# Patient Record
Sex: MALE | Race: WHITE | NOT HISPANIC OR LATINO | Employment: FULL TIME | URBAN - METROPOLITAN AREA
[De-identification: names, ages, dates, MRNs, and addresses within clinical notes are randomized per-mention and may not be internally consistent; named-entity substitution may affect disease eponyms.]

---

## 2017-06-27 ENCOUNTER — HISTORICAL (OUTPATIENT)
Dept: PREADMISSION TESTING | Facility: HOSPITAL | Age: 57
End: 2017-06-27

## 2017-06-27 ENCOUNTER — HISTORICAL (OUTPATIENT)
Dept: LAB | Facility: HOSPITAL | Age: 57
End: 2017-06-27

## 2017-06-27 LAB
ABS NEUT (OLG): 3.43 X10(3)/MCL (ref 2.1–9.2)
BASOPHILS # BLD AUTO: 0 X10(3)/MCL (ref 0–0.2)
BASOPHILS NFR BLD AUTO: 1 %
BUN SERPL-MCNC: 20 MG/DL (ref 7–18)
CALCIUM SERPL-MCNC: 8.4 MG/DL (ref 8.5–10.1)
CHLORIDE SERPL-SCNC: 109 MMOL/L (ref 98–107)
CO2 SERPL-SCNC: 24 MMOL/L (ref 21–32)
CREAT SERPL-MCNC: 0.94 MG/DL (ref 0.7–1.3)
EOSINOPHIL # BLD AUTO: 0.2 X10(3)/MCL (ref 0–0.9)
EOSINOPHIL NFR BLD AUTO: 3 %
ERYTHROCYTE [DISTWIDTH] IN BLOOD BY AUTOMATED COUNT: 14.8 % (ref 11.5–17)
GLUCOSE SERPL-MCNC: 89 MG/DL (ref 74–106)
HCT VFR BLD AUTO: 45.3 % (ref 42–52)
HGB BLD-MCNC: 14.6 GM/DL (ref 14–18)
LYMPHOCYTES # BLD AUTO: 2 X10(3)/MCL (ref 0.6–4.6)
LYMPHOCYTES NFR BLD AUTO: 31 %
MCH RBC QN AUTO: 28.3 PG (ref 27–31)
MCHC RBC AUTO-ENTMCNC: 32.2 GM/DL (ref 33–36)
MCV RBC AUTO: 88 FL (ref 80–94)
MONOCYTES # BLD AUTO: 0.7 X10(3)/MCL (ref 0.1–1.3)
MONOCYTES NFR BLD AUTO: 11 %
NEUTROPHILS # BLD AUTO: 3.43 X10(3)/MCL (ref 2.1–9.2)
NEUTROPHILS NFR BLD AUTO: 54 %
PLATELET # BLD AUTO: 199 X10(3)/MCL (ref 130–400)
PMV BLD AUTO: 11.8 FL (ref 9.4–12.4)
POTASSIUM SERPL-SCNC: 4.6 MMOL/L (ref 3.5–5.1)
RBC # BLD AUTO: 5.15 X10(6)/MCL (ref 4.7–6.1)
SODIUM SERPL-SCNC: 142 MMOL/L (ref 136–145)
WBC # SPEC AUTO: 6.4 X10(3)/MCL (ref 4.5–11.5)

## 2017-07-06 ENCOUNTER — HISTORICAL (OUTPATIENT)
Dept: SURGERY | Facility: HOSPITAL | Age: 57
End: 2017-07-06

## 2019-07-06 LAB
BILIRUB SERPL-MCNC: NEGATIVE MG/DL
BLOOD URINE, POC: NEGATIVE
CLARITY, POC UA: CLEAR
COLOR, POC UA: NORMAL
GLUCOSE UR QL STRIP: NEGATIVE
KETONES UR QL STRIP: NEGATIVE
LEUKOCYTE EST, POC UA: NEGATIVE
NITRITE, POC UA: NEGATIVE
PH, POC UA: 5
PROTEIN, POC: NEGATIVE
SPECIFIC GRAVITY, POC UA: 1.02
UROBILINOGEN, POC UA: NORMAL

## 2021-12-29 LAB
INFLUENZA A ANTIGEN, POC: NEGATIVE
INFLUENZA B ANTIGEN, POC: NEGATIVE
SARS-COV-2 RNA RESP QL NAA+PROBE: POSITIVE

## 2022-01-02 ENCOUNTER — HISTORICAL (OUTPATIENT)
Dept: ADMINISTRATIVE | Facility: HOSPITAL | Age: 62
End: 2022-01-02

## 2022-01-02 LAB — SARS-COV-2 RNA RESP QL NAA+PROBE: POSITIVE

## 2022-04-10 ENCOUNTER — HISTORICAL (OUTPATIENT)
Dept: ADMINISTRATIVE | Facility: HOSPITAL | Age: 62
End: 2022-04-10
Payer: COMMERCIAL

## 2022-04-28 VITALS
SYSTOLIC BLOOD PRESSURE: 155 MMHG | OXYGEN SATURATION: 97 % | WEIGHT: 315 LBS | DIASTOLIC BLOOD PRESSURE: 87 MMHG | BODY MASS INDEX: 42.66 KG/M2 | HEIGHT: 72 IN

## 2022-04-30 NOTE — OP NOTE
DATE OF SURGERY:    07/06/2017    SURGEON:  Evaristo Pathak MD    PREOPERATIVE DIAGNOSIS:  Recurrent incarcerated umbilical hernia.    POSTOPERATIVE DIAGNOSIS:  Recurrent incarcerated umbilical hernia.    PROCEDURE:  Repair of an incarcerated recurrent umbilical hernia.    PROCEDURE IN DETAIL:  The patient was placed on the operating table and in supine position.  Abdomen was prepped and draped in the usual sterile fashion after placing him under general endotracheal anesthesia.  After appropriate timeout, the incision was performed in vertical fashion starting three fingerbreadths above the umbilicus encircling the umbilicus to the right and continuing another three fingerbreadths just below the umbilicus.  The patient had a fairly large protrusion, but as we dissected the hernia sac and tissue away from the fascial defect, the defect did not even admit my index and long fingers in diameter.  The hernia sac was opened in omentum, which was trapped and stuck, was freed up from the hernia sac and reduced.  The excess hernia sac was excised and submitted for pathologic exam.  Elevation of skin flaps was then done and closure of the fascia and peritoneum simultaneously was accomplished with interrupted figure-of-eight sutures of #1 Prolene.  I took the polypropylene mesh after having soaked it in antibiotics soaked bacitracin, placed it on the fascial layer and secured it with 0 Prolene and VersaTack stapling device.  15 Wes drain was placed over the repair and brought out through a separate stab wound down the right lower quadrant and secured to the skin with 2-0 silk suture ligature.  The umbilicus was tacked back down with 3-0 Vicryl suture and subcutaneous tissue approximated with 2-0 plain catgut suture.  Skin was closed with proximate autostapling device completing the procedure.  Estimated blood loss was less than 15 mL.  Sponge, needle and instrument counts are correct.  The patient had dressing  applied.  The patient was awakened, placed in abdominal binder and brought to the recovery room in stable condition.        ______________________________  MD NERY Fernandez/SVETA  DD:  07/06/2017  Time:  01:38PM  DT:  07/07/2017  Time:  07:11AM  Job #:  [id number]

## 2022-08-01 ENCOUNTER — OFFICE VISIT (OUTPATIENT)
Dept: UROLOGY | Facility: CLINIC | Age: 62
End: 2022-08-01
Payer: COMMERCIAL

## 2022-08-01 VITALS
WEIGHT: 315 LBS | HEIGHT: 72 IN | SYSTOLIC BLOOD PRESSURE: 131 MMHG | BODY MASS INDEX: 42.66 KG/M2 | HEART RATE: 77 BPM | TEMPERATURE: 98 F | RESPIRATION RATE: 18 BRPM | DIASTOLIC BLOOD PRESSURE: 79 MMHG | OXYGEN SATURATION: 98 %

## 2022-08-01 DIAGNOSIS — R35.0 BENIGN PROSTATIC HYPERPLASIA WITH URINARY FREQUENCY: Primary | ICD-10-CM

## 2022-08-01 DIAGNOSIS — N40.1 BENIGN PROSTATIC HYPERPLASIA WITH URINARY FREQUENCY: Primary | ICD-10-CM

## 2022-08-01 DIAGNOSIS — R35.0 URINARY FREQUENCY: ICD-10-CM

## 2022-08-01 PROCEDURE — 3008F BODY MASS INDEX DOCD: CPT | Mod: CPTII,,, | Performed by: NURSE PRACTITIONER

## 2022-08-01 PROCEDURE — 3075F SYST BP GE 130 - 139MM HG: CPT | Mod: CPTII,,, | Performed by: NURSE PRACTITIONER

## 2022-08-01 PROCEDURE — 99214 PR OFFICE/OUTPT VISIT, EST, LEVL IV, 30-39 MIN: ICD-10-PCS | Mod: S$PBB,,, | Performed by: NURSE PRACTITIONER

## 2022-08-01 PROCEDURE — 3008F PR BODY MASS INDEX (BMI) DOCUMENTED: ICD-10-PCS | Mod: CPTII,,, | Performed by: NURSE PRACTITIONER

## 2022-08-01 PROCEDURE — 3075F PR MOST RECENT SYSTOLIC BLOOD PRESS GE 130-139MM HG: ICD-10-PCS | Mod: CPTII,,, | Performed by: NURSE PRACTITIONER

## 2022-08-01 PROCEDURE — 3078F DIAST BP <80 MM HG: CPT | Mod: CPTII,,, | Performed by: NURSE PRACTITIONER

## 2022-08-01 PROCEDURE — 3078F PR MOST RECENT DIASTOLIC BLOOD PRESSURE < 80 MM HG: ICD-10-PCS | Mod: CPTII,,, | Performed by: NURSE PRACTITIONER

## 2022-08-01 PROCEDURE — 99214 OFFICE O/P EST MOD 30 MIN: CPT | Mod: PBBFAC

## 2022-08-01 PROCEDURE — 99214 OFFICE O/P EST MOD 30 MIN: CPT | Mod: S$PBB,,, | Performed by: NURSE PRACTITIONER

## 2022-08-01 PROCEDURE — 51798 US URINE CAPACITY MEASURE: CPT | Mod: PBBFAC | Performed by: NURSE PRACTITIONER

## 2022-08-01 RX ORDER — ASPIRIN 81 MG/1
81 TABLET ORAL DAILY
COMMUNITY

## 2022-08-01 RX ORDER — TAMSULOSIN HYDROCHLORIDE 0.4 MG/1
0.4 CAPSULE ORAL DAILY
Qty: 30 CAPSULE | Refills: 11 | Status: SHIPPED | OUTPATIENT
Start: 2022-08-01 | End: 2022-11-28

## 2022-08-01 NOTE — PROGRESS NOTES
Patient seen by DALE Patel. Rx for flomax and RTC 6 weeks. Discharge instructions given verbal and written.

## 2022-08-01 NOTE — PROGRESS NOTES
Chief Complaint:   Chief Complaint   Patient presents with    Urinary Frequency     Referral 12/07/2021 for urinary frequency        HPI:  Patient is a 61-year-old male referred to Urology for urinary frequency.  Patient by PCP was noted to have episodes of Urinary frequency 10 per day.  Patient admits to drinking 10 Dr. Prepper day.  But recently has tried to cut back as far down to 6-7.  Today patient presents with urinary urgency, frequency, intermittent erectile dysfunction.  Patient denies dysuria, incontinence, retention, gross hematuria, nocturia. Patient denies family history of urologic pathology, personal history of stones.       Allergies:  Review of patient's allergies indicates:  No Known Allergies    Medications:  Current Outpatient Medications   Medication Sig Dispense Refill    aspirin (ECOTRIN) 81 MG EC tablet Take 81 mg by mouth Daily.       No current facility-administered medications for this visit.       Review of Systems:  General: No fever, chills, fatigability, or weight loss.  Skin: No rashes, itching, or changes in color or texture of skin.  Chest: Denies CALDWELL, cyanosis, wheezing, cough, and sputum production.  Abdomen: Appetite fine. No weight loss. Denies diarrhea, abdominal pain, hematemesis, or blood in stool.  Musculoskeletal: No joint stiffness or swelling. Denies back pain.  : As above.  All other review of systems negative.    PMH:  No past medical history on file.    PSH:  Past Surgical History:   Procedure Laterality Date    CHOLECYSTECTOMY      HERNIA REPAIR      LIVER BIOPSY         FamHx:  Family History   Problem Relation Age of Onset    Heart disease Father     Cancer Mother     COPD Mother        SocHx:  Social History     Socioeconomic History    Marital status:    Tobacco Use    Smoking status: Former Smoker    Smokeless tobacco: Never Used       Physical Exam:  Vitals:    08/01/22 0746   BP: 131/79   Pulse: 77   Resp: 18   Temp: 98.4 °F (36.9 °C)      General: A&Ox3, no apparent distress, no deformities  Neck: No masses, normal thyroid  Lungs: CTA rosalino, no use of accessory muscles  Heart: RRR, no arrhythmias  Abdomen: Soft, NT, ND, no masses, no hernias, no hepatosplenomegaly  Lymphatic: Neck and groin nodes negative  Skin: The skin is warm and dry. No jaundice.  Ext: No c/c/e.    GUMale: Test desc rosalino, no abnormalities of epididymus. Penis, with normal penile and scrotal skin. Meatus normal. Normal rectal tone, no hemorrhoids. Prostate 2 fingerbreadths wide, smooth, soft, nontender, no nodules or masses appreciated. SV not palpable. Perineum and anus normal.          Impression:  Erectile dysfunction, BPH, urinary Frequency      Plan: Start Flomax 0.4 mg p.o. q.h.s. F/U 6 Weeks.  Instructed patient to decrease consult from Dr. Blair, instructed patient on timed voiding every 2-3 hours, double voiding, avoidance of bladder irritants such as alcohol citrus foods & drinks, chocolates, caffeinated drinks, energy drinks, spicey foods, sodas, increase water intake.

## 2022-09-17 ENCOUNTER — HISTORICAL (OUTPATIENT)
Dept: ADMINISTRATIVE | Facility: HOSPITAL | Age: 62
End: 2022-09-17
Payer: COMMERCIAL

## 2022-11-28 ENCOUNTER — OFFICE VISIT (OUTPATIENT)
Dept: UROLOGY | Facility: CLINIC | Age: 62
End: 2022-11-28
Payer: COMMERCIAL

## 2022-11-28 VITALS
WEIGHT: 315 LBS | HEART RATE: 76 BPM | SYSTOLIC BLOOD PRESSURE: 138 MMHG | HEIGHT: 71 IN | RESPIRATION RATE: 18 BRPM | TEMPERATURE: 98 F | BODY MASS INDEX: 44.1 KG/M2 | OXYGEN SATURATION: 100 % | DIASTOLIC BLOOD PRESSURE: 86 MMHG

## 2022-11-28 DIAGNOSIS — R33.8 BENIGN PROSTATIC HYPERPLASIA WITH URINARY RETENTION: Primary | ICD-10-CM

## 2022-11-28 DIAGNOSIS — N40.1 BENIGN PROSTATIC HYPERPLASIA WITH URINARY RETENTION: Primary | ICD-10-CM

## 2022-11-28 PROCEDURE — 3079F PR MOST RECENT DIASTOLIC BLOOD PRESSURE 80-89 MM HG: ICD-10-PCS | Mod: CPTII,,, | Performed by: NURSE PRACTITIONER

## 2022-11-28 PROCEDURE — 1159F PR MEDICATION LIST DOCUMENTED IN MEDICAL RECORD: ICD-10-PCS | Mod: CPTII,,, | Performed by: NURSE PRACTITIONER

## 2022-11-28 PROCEDURE — 99213 OFFICE O/P EST LOW 20 MIN: CPT | Mod: S$PBB,,, | Performed by: NURSE PRACTITIONER

## 2022-11-28 PROCEDURE — 1160F PR REVIEW ALL MEDS BY PRESCRIBER/CLIN PHARMACIST DOCUMENTED: ICD-10-PCS | Mod: CPTII,,, | Performed by: NURSE PRACTITIONER

## 2022-11-28 PROCEDURE — 3008F BODY MASS INDEX DOCD: CPT | Mod: CPTII,,, | Performed by: NURSE PRACTITIONER

## 2022-11-28 PROCEDURE — 3075F SYST BP GE 130 - 139MM HG: CPT | Mod: CPTII,,, | Performed by: NURSE PRACTITIONER

## 2022-11-28 PROCEDURE — 99214 OFFICE O/P EST MOD 30 MIN: CPT | Mod: PBBFAC | Performed by: NURSE PRACTITIONER

## 2022-11-28 PROCEDURE — 99213 PR OFFICE/OUTPT VISIT, EST, LEVL III, 20-29 MIN: ICD-10-PCS | Mod: S$PBB,,, | Performed by: NURSE PRACTITIONER

## 2022-11-28 PROCEDURE — 3079F DIAST BP 80-89 MM HG: CPT | Mod: CPTII,,, | Performed by: NURSE PRACTITIONER

## 2022-11-28 PROCEDURE — 1160F RVW MEDS BY RX/DR IN RCRD: CPT | Mod: CPTII,,, | Performed by: NURSE PRACTITIONER

## 2022-11-28 PROCEDURE — 1159F MED LIST DOCD IN RCRD: CPT | Mod: CPTII,,, | Performed by: NURSE PRACTITIONER

## 2022-11-28 PROCEDURE — 3075F PR MOST RECENT SYSTOLIC BLOOD PRESS GE 130-139MM HG: ICD-10-PCS | Mod: CPTII,,, | Performed by: NURSE PRACTITIONER

## 2022-11-28 PROCEDURE — 3008F PR BODY MASS INDEX (BMI) DOCUMENTED: ICD-10-PCS | Mod: CPTII,,, | Performed by: NURSE PRACTITIONER

## 2022-11-28 RX ORDER — TAMSULOSIN HYDROCHLORIDE 0.4 MG/1
0.4 CAPSULE ORAL DAILY
Qty: 90 CAPSULE | Refills: 3 | Status: SHIPPED | OUTPATIENT
Start: 2022-11-28 | End: 2023-11-28

## 2022-11-28 NOTE — PROGRESS NOTES
Pt seen by DALE Patel; Pt instructed to return to clinic in 6 months w/PSA; Discharge paperwork given w/pt verbalizing understanding

## 2022-11-28 NOTE — PROGRESS NOTES
Chief Complaint:   Chief Complaint   Patient presents with    6 week follow up ED, BPH & frequency (Pt not taking flomax)       HPI:  Patient is a 62-year-old male here for 6 week follow-up BPH and ED. patient admits to not taking tamsulosin as prescribed (non-compliant with meds).  Patient states he is decreased his Dr. Peppers from anywhere to 2-4 per day but has increased water intake.  Today patient denies dysuria, urinary urgency, frequency, incontinence, retention, gross hematuria, nocturia. Patient denies family history of urologic pathology.         Allergies:  Review of patient's allergies indicates:  No Known Allergies    Medications:  Current Outpatient Medications   Medication Sig Dispense Refill    aspirin (ECOTRIN) 81 MG EC tablet Take 81 mg by mouth Daily.      tamsulosin (FLOMAX) 0.4 mg Cap Take 1 capsule (0.4 mg total) by mouth once daily. (Patient not taking: Reported on 11/28/2022) 30 capsule 11     No current facility-administered medications for this visit.       Review of Systems:  General: No fever, chills, fatigability, or weight loss.  Skin: No rashes, itching, or changes in color or texture of skin.  Chest: Denies CALDWELL, cyanosis, wheezing, cough, and sputum production.  Abdomen: Appetite fine. No weight loss. Denies diarrhea, abdominal pain, hematemesis, or blood in stool.  Musculoskeletal: No joint stiffness or swelling. Denies back pain.  : As above.  All other review of systems negative.    PMH:  History reviewed. No pertinent past medical history.    PSH:  Past Surgical History:   Procedure Laterality Date    CHOLECYSTECTOMY      HERNIA REPAIR      LIVER BIOPSY         FamHx:  Family History   Problem Relation Age of Onset    Heart disease Father     Cancer Mother     COPD Mother        SocHx:  Social History     Socioeconomic History    Marital status:    Tobacco Use    Smoking status: Former    Smokeless tobacco: Never   Substance and Sexual Activity    Alcohol use: Not  Currently    Drug use: Never       Physical Exam:  Vitals:    11/28/22 0840   BP: 138/86   Pulse: 76   Resp: 18   Temp: 97.6 °F (36.4 °C)     General: A&Ox3, no apparent distress, no deformities  Neck: No masses, normal thyroid  Lungs: CTA rosalino, no use of accessory muscles  Heart: RRR, no arrhythmias  Abdomen: Soft, NT, ND, no masses, no hernias, no hepatosplenomegaly  Lymphatic: Neck and groin nodes negative  Skin: The skin is warm and dry. No jaundice.  Ext: No c/c/e.        Impression: BPH       Plan: Instructed patient med compliance with tamsulosin.  Reinforced timed voiding every 2-3 hours, double voiding, avoidance of bladder irritants such as alcohol citrus foods & drinks, chocolates, caffeinated drinks, energy drinks, spicy.  Instructed patient on avoiding drinking fluids 2 hours prior to bedtime and throughout the night along with voiding immediately prior to bedtime. RTC 6 months with PSA.

## 2023-01-22 ENCOUNTER — OFFICE VISIT (OUTPATIENT)
Dept: URGENT CARE | Facility: CLINIC | Age: 63
End: 2023-01-22
Payer: COMMERCIAL

## 2023-01-22 VITALS
HEIGHT: 70 IN | BODY MASS INDEX: 45.1 KG/M2 | TEMPERATURE: 98 F | OXYGEN SATURATION: 96 % | HEART RATE: 77 BPM | WEIGHT: 315 LBS | SYSTOLIC BLOOD PRESSURE: 130 MMHG | DIASTOLIC BLOOD PRESSURE: 66 MMHG | RESPIRATION RATE: 20 BRPM

## 2023-01-22 DIAGNOSIS — U07.1 COVID-19 VIRUS DETECTED: ICD-10-CM

## 2023-01-22 DIAGNOSIS — Z11.52 ENCOUNTER FOR SCREENING FOR COVID-19: ICD-10-CM

## 2023-01-22 DIAGNOSIS — U07.1 COVID: Primary | ICD-10-CM

## 2023-01-22 LAB
CTP QC/QA: YES
SARS-COV-2 RDRP RESP QL NAA+PROBE: POSITIVE

## 2023-01-22 PROCEDURE — 99214 OFFICE O/P EST MOD 30 MIN: CPT | Mod: PBBFAC | Performed by: NURSE PRACTITIONER

## 2023-01-22 PROCEDURE — 87635 SARS-COV-2 COVID-19 AMP PRB: CPT | Mod: PBBFAC | Performed by: NURSE PRACTITIONER

## 2023-01-22 PROCEDURE — 99213 OFFICE O/P EST LOW 20 MIN: CPT | Mod: S$PBB,,, | Performed by: NURSE PRACTITIONER

## 2023-01-22 PROCEDURE — 99213 PR OFFICE/OUTPT VISIT, EST, LEVL III, 20-29 MIN: ICD-10-PCS | Mod: S$PBB,,, | Performed by: NURSE PRACTITIONER

## 2023-01-22 NOTE — LETTER
January 22, 2023      [facility]  [facility] [facility] [address]  Phone: [phone]       Patient: Errol Hannah   YOB: 1960  Date of Visit: 01/22/2023    To Whom It May Concern:    Jacob Hannah  was at [facility] on 01/22/2023. The patient may return to work/school on 1/26/2023 with no restrictions. If you have any questions or concerns, or if I can be of further assistance, please do not hesitate to contact me.    Sincerely,    RAMIN Ospina

## 2023-01-22 NOTE — PROGRESS NOTES
"Subjective:       Patient ID: Errol Hannah is a 62 y.o. male.    Vitals:  height is 5' 10.47" (1.79 m) and weight is 143.7 kg (316 lb 12.8 oz) (abnormal). His oral temperature is 98.3 °F (36.8 °C). His blood pressure is 130/66 and his pulse is 77. His respiration is 20 and oxygen saturation is 96%.     Chief Complaint: Cough, Fever (100.3), sneezing, Generalized Body Aches, and covid positive at home test    CC as above.       Constitution: Positive for fever.   HENT: Negative.     Neck: neck negative.   Cardiovascular: Negative.    Respiratory:  Positive for cough.      Objective:      Physical Exam   Constitutional: He is oriented to person, place, and time. He appears well-developed.   HENT:   Head: Normocephalic.   Ears:   Right Ear: Tympanic membrane normal.   Left Ear: Tympanic membrane normal.   Nose: Congestion present.   Mouth/Throat: Oropharynx is clear.   Eyes: Conjunctivae and EOM are normal. Pupils are equal, round, and reactive to light.   Neck: Neck supple.   Cardiovascular: Normal rate, regular rhythm and normal heart sounds.   Pulmonary/Chest: Effort normal and breath sounds normal.   Musculoskeletal: Normal range of motion.         General: Normal range of motion.   Neurological: He is alert and oriented to person, place, and time.   Skin: Skin is warm and dry.   Psychiatric: His behavior is normal.   Vitals reviewed.      Assessment:       1. COVID    2. Encounter for screening for COVID-19              Office Visit on 01/22/2023   Component Date Value Ref Range Status    POC Rapid COVID 01/22/2023 Positive (A)  Negative Final     Acceptable 01/22/2023 Yes   Final        No results found.   Plan:           rapid COVID positive.  Maintain home quarantine for 5 days, may discontinue quarantine when symptoms improve and if fever free for 24 hours without fever reducing medication.  If at any time you experience any increased shortness of breath, wheezing or any increased symptoms then " immediately go to ER.    COVID    Encounter for screening for COVID-19  -     POCT COVID-19 Rapid Screening

## 2023-01-23 ENCOUNTER — NURSE TRIAGE (OUTPATIENT)
Dept: ADMINISTRATIVE | Facility: CLINIC | Age: 63
End: 2023-01-23
Payer: COMMERCIAL

## 2023-01-23 NOTE — TELEPHONE ENCOUNTER
Pts wife called about advice and denies any worsening at this time. Pt just sneezing and runny nose but doesn't need triaging. They just had questions about medications. Pt denies any SOB or fever at this time and wanted to know about medication OTC that he can take. Pts wife told to call back if any SOB, CP or fever for over 3 days and will reach out as needed. Pt will reach out to PCP that is non [facility] provider            Reason for Disposition   Information only question and nurse able to answer    Protocols used: Information Only Call - No Triage-A-[place]

## 2023-05-29 ENCOUNTER — OFFICE VISIT (OUTPATIENT)
Dept: UROLOGY | Facility: CLINIC | Age: 63
End: 2023-05-29
Payer: COMMERCIAL

## 2023-05-29 VITALS
TEMPERATURE: 98 F | SYSTOLIC BLOOD PRESSURE: 97 MMHG | BODY MASS INDEX: 45.1 KG/M2 | DIASTOLIC BLOOD PRESSURE: 74 MMHG | HEIGHT: 70 IN | HEART RATE: 67 BPM | RESPIRATION RATE: 20 BRPM | WEIGHT: 315 LBS | OXYGEN SATURATION: 97 %

## 2023-05-29 DIAGNOSIS — R33.8 BENIGN PROSTATIC HYPERPLASIA WITH URINARY RETENTION: Primary | ICD-10-CM

## 2023-05-29 DIAGNOSIS — R35.1 NOCTURIA: ICD-10-CM

## 2023-05-29 DIAGNOSIS — N32.81 OAB (OVERACTIVE BLADDER): ICD-10-CM

## 2023-05-29 DIAGNOSIS — N40.1 BENIGN PROSTATIC HYPERPLASIA WITH URINARY RETENTION: Primary | ICD-10-CM

## 2023-05-29 DIAGNOSIS — R97.20 ELEVATED PSA: ICD-10-CM

## 2023-05-29 LAB
BILIRUB SERPL-MCNC: NORMAL MG/DL
BLOOD URINE, POC: NORMAL
COLOR, POC UA: YELLOW
GLUCOSE UR QL STRIP: NORMAL
KETONES UR QL STRIP: NORMAL
LEUKOCYTE ESTERASE URINE, POC: NORMAL
NITRITE, POC UA: NORMAL
PH, POC UA: 5.5
PROTEIN, POC: NORMAL
SPECIFIC GRAVITY, POC UA: >=1.03
UROBILINOGEN, POC UA: 0.2

## 2023-05-29 PROCEDURE — 3078F PR MOST RECENT DIASTOLIC BLOOD PRESSURE < 80 MM HG: ICD-10-PCS | Mod: CPTII,,, | Performed by: NURSE PRACTITIONER

## 2023-05-29 PROCEDURE — 3008F BODY MASS INDEX DOCD: CPT | Mod: CPTII,,, | Performed by: NURSE PRACTITIONER

## 2023-05-29 PROCEDURE — 3078F DIAST BP <80 MM HG: CPT | Mod: CPTII,,, | Performed by: NURSE PRACTITIONER

## 2023-05-29 PROCEDURE — 81001 URINALYSIS AUTO W/SCOPE: CPT | Mod: PBBFAC | Performed by: NURSE PRACTITIONER

## 2023-05-29 PROCEDURE — 1159F PR MEDICATION LIST DOCUMENTED IN MEDICAL RECORD: ICD-10-PCS | Mod: CPTII,,, | Performed by: NURSE PRACTITIONER

## 2023-05-29 PROCEDURE — 99214 OFFICE O/P EST MOD 30 MIN: CPT | Mod: PBBFAC | Performed by: NURSE PRACTITIONER

## 2023-05-29 PROCEDURE — 99213 PR OFFICE/OUTPT VISIT, EST, LEVL III, 20-29 MIN: ICD-10-PCS | Mod: S$PBB,,, | Performed by: NURSE PRACTITIONER

## 2023-05-29 PROCEDURE — 1159F MED LIST DOCD IN RCRD: CPT | Mod: CPTII,,, | Performed by: NURSE PRACTITIONER

## 2023-05-29 PROCEDURE — 3074F SYST BP LT 130 MM HG: CPT | Mod: CPTII,,, | Performed by: NURSE PRACTITIONER

## 2023-05-29 PROCEDURE — 1160F PR REVIEW ALL MEDS BY PRESCRIBER/CLIN PHARMACIST DOCUMENTED: ICD-10-PCS | Mod: CPTII,,, | Performed by: NURSE PRACTITIONER

## 2023-05-29 PROCEDURE — 1160F RVW MEDS BY RX/DR IN RCRD: CPT | Mod: CPTII,,, | Performed by: NURSE PRACTITIONER

## 2023-05-29 PROCEDURE — 3008F PR BODY MASS INDEX (BMI) DOCUMENTED: ICD-10-PCS | Mod: CPTII,,, | Performed by: NURSE PRACTITIONER

## 2023-05-29 PROCEDURE — 99213 OFFICE O/P EST LOW 20 MIN: CPT | Mod: S$PBB,,, | Performed by: NURSE PRACTITIONER

## 2023-05-29 PROCEDURE — 3074F PR MOST RECENT SYSTOLIC BLOOD PRESSURE < 130 MM HG: ICD-10-PCS | Mod: CPTII,,, | Performed by: NURSE PRACTITIONER

## 2023-05-29 RX ORDER — OXYBUTYNIN CHLORIDE 5 MG/1
5 TABLET, EXTENDED RELEASE ORAL DAILY
Qty: 90 TABLET | Refills: 3 | Status: SHIPPED | OUTPATIENT
Start: 2023-05-29 | End: 2024-05-28

## 2023-05-29 NOTE — PROGRESS NOTES
Chief Complaint:   Chief Complaint   Patient presents with    BPH- urinary frequency       HPI: Patient is a 62-year-old male here for a six-month follow-up urinary frequency.  Patient by PCP was noted to have episodes of Urinary frequency 10 per day.  Patient admits to drinking 10 Dr. Prepper day.  But recently has tried to cut back as far down to 6-7.  On last visit patient started on Flomax 0.4 mg p.o. daily.  Patient today complaining of nocturia 4-5 times nightly despite abstaining from drinking water 3-4 hours prior to bedtime.  We will trial oxybutynin XL 0.5 mg p.o. daily return to clinic in 6 weeks.    Allergies:  Review of patient's allergies indicates:  No Known Allergies    Medications:  Current Outpatient Medications   Medication Sig Dispense Refill    aspirin (ECOTRIN) 81 MG EC tablet Take 81 mg by mouth Daily.      tamsulosin (FLOMAX) 0.4 mg Cap Take 1 capsule (0.4 mg total) by mouth once daily. 90 capsule 3     No current facility-administered medications for this visit.       Review of Systems:  General: No fever, chills, fatigability, or weight loss.  Skin: No rashes, itching, or changes in color or texture of skin.  Chest: Denies CALDWELL, cyanosis, wheezing, cough, and sputum production.  Abdomen: Appetite fine. No weight loss. Denies diarrhea, abdominal pain, hematemesis, or blood in stool.  Musculoskeletal: No joint stiffness or swelling. Denies back pain.  : As above.  All other review of systems negative.    PMH:  No past medical history on file.    PSH:  Past Surgical History:   Procedure Laterality Date    CHOLECYSTECTOMY      HERNIA REPAIR      LIVER BIOPSY         FamHx:  Family History   Problem Relation Age of Onset    Heart disease Father     Cancer Mother     COPD Mother        SocHx:  Social History     Socioeconomic History    Marital status:    Tobacco Use    Smoking status: Former    Smokeless tobacco: Never   Substance and Sexual Activity    Alcohol use: Not Currently    Drug  use: Never       Physical Exam:  There were no vitals filed for this visit.  General: A&Ox3, no apparent distress, no deformities  Neck: No masses, normal thyroid  Lungs: CTA rosalino, no use of accessory muscles  Heart: RRR, no arrhythmias  Abdomen: Soft, NT, ND, no masses, no hernias, no hepatosplenomegaly  Lymphatic: Neck and groin nodes negative  Skin: The skin is warm and dry. No jaundice.  Ext: No c/c/e.    GUMale: Test desc rosalino, no abnormalities of epididymus. Penis, with normal penile and scrotal skin. Meatus normal. Normal rectal tone, no hemorrhoids. Prostate: finger breadth wide, smooth, soft, nontender, no nodules or masses appreciated. SV not palpable. Perineum and anus normal.      Recent Labs     05/27/23  0844   PSA 2.19        Urinalysis:  Color, UA Yellow    Spec Grav UA >=1.030    pH, UA 5.5    WBC, UA neg    Nitrite, UA neg    Protein, POC neg    Glucose, UA neg    Ketones, UA neg    Urobilinogen, UA 0.2    Bilirubin, POC neg    Blood, UA trace-intact          Impression:  BPH, overactive bladder.      Plan: Instructed patient continue Flomax 0.4 mg p.o. daily and trial oxybutynin XL 5 mg p.o. daily RTC 6 weeks.  Reinforced patient teaching on timed voiding every 2-3 hrs, double voiding, avoidance of bladder irritants such as alcohol, citrus foods, chocolate, caffeinated drinks, energy drinks, spicy foods, sugar, caffeine products, sodas. Instructed patient to avoid drinking fluids 1-2 hours prior to bedtime & void immediately before bedtime.

## 2023-05-29 NOTE — PROGRESS NOTES
Placed in room. Seen by Sony Patel NP. Spoke with patient. Poct and dipstick done. RTC in 6 weeks.

## 2024-05-31 ENCOUNTER — OFFICE VISIT (OUTPATIENT)
Dept: URGENT CARE | Facility: CLINIC | Age: 64
End: 2024-05-31
Payer: COMMERCIAL

## 2024-05-31 VITALS
HEART RATE: 70 BPM | WEIGHT: 315 LBS | HEIGHT: 69 IN | SYSTOLIC BLOOD PRESSURE: 111 MMHG | OXYGEN SATURATION: 97 % | TEMPERATURE: 98 F | BODY MASS INDEX: 46.65 KG/M2 | RESPIRATION RATE: 20 BRPM | DIASTOLIC BLOOD PRESSURE: 70 MMHG

## 2024-05-31 DIAGNOSIS — L03.114 CELLULITIS OF LEFT FOREARM: Primary | ICD-10-CM

## 2024-05-31 PROCEDURE — 99213 OFFICE O/P EST LOW 20 MIN: CPT | Mod: PBBFAC | Performed by: FAMILY MEDICINE

## 2024-05-31 PROCEDURE — 99214 OFFICE O/P EST MOD 30 MIN: CPT | Mod: S$PBB,,, | Performed by: FAMILY MEDICINE

## 2024-05-31 RX ORDER — CLINDAMYCIN HYDROCHLORIDE 300 MG/1
300 CAPSULE ORAL EVERY 8 HOURS
Qty: 30 CAPSULE | Refills: 0 | Status: SHIPPED | OUTPATIENT
Start: 2024-05-31 | End: 2024-06-10

## 2024-05-31 NOTE — PROGRESS NOTES
"Subjective:       Patient ID: Errol Hannah is a 63 y.o. male.    Chief Complaint: Arm Pain (LT redness noted to elbow x 1day, no injury noted)      Arm Pain       63-year-old male with left posterior forearm pain and redness.  It is just below his elbow.  He denies any injuries or falls.  It is painful to touch.  Review of Systems   Integumentary:         As above         Objective:       Vital Signs  Temp: 98 °F (36.7 °C)  Pulse: 70  Resp: 20  SpO2: 97 %  BP: 111/70  Pain Score:   5  Pain Loc: Elbow  Height and Weight  Height: 5' 9" (175.3 cm)  Weight: (!) 143.2 kg (315 lb 11.2 oz)  BSA (Calculated - sq m): 2.64 sq meters  BMI (Calculated): 46.6  Weight in (lb) to have BMI = 25: 168.9]  Physical Exam  Vitals reviewed.   Constitutional:       Appearance: Normal appearance.   HENT:      Head: Normocephalic and atraumatic.   Eyes:      Extraocular Movements: Extraocular movements intact.      Conjunctiva/sclera: Conjunctivae normal.   Skin:            Comments: [place] denotes 2 cm in diameter area of erythema and tenderness to palpation.  No underlying fluctuance or exudate.   Neurological:      General: No focal deficit present.      Mental Status: He is alert.   Psychiatric:         Mood and Affect: Mood normal.         Behavior: Behavior normal.         Assessment:       Problem List Items Addressed This Visit    None  Visit Diagnoses       Cellulitis of left forearm    -  Primary    Relevant Medications    clindamycin (CLEOCIN) 300 MG capsule            Plan:   Encouraged warm compresses and massage  Encouraged ibuprofen/acetaminophen as needed  ER precautions  Follow-up with PCP      "

## 2024-12-24 ENCOUNTER — OFFICE VISIT (OUTPATIENT)
Dept: URGENT CARE | Facility: CLINIC | Age: 64
End: 2024-12-24
Payer: COMMERCIAL

## 2024-12-24 VITALS
BODY MASS INDEX: 45.84 KG/M2 | HEART RATE: 79 BPM | HEIGHT: 69 IN | SYSTOLIC BLOOD PRESSURE: 147 MMHG | OXYGEN SATURATION: 98 % | RESPIRATION RATE: 20 BRPM | WEIGHT: 309.5 LBS | TEMPERATURE: 98 F | DIASTOLIC BLOOD PRESSURE: 84 MMHG

## 2024-12-24 DIAGNOSIS — J06.9 VIRAL URI WITH COUGH: Primary | ICD-10-CM

## 2024-12-24 PROCEDURE — 99214 OFFICE O/P EST MOD 30 MIN: CPT | Mod: PBBFAC

## 2024-12-24 PROCEDURE — 63600175 PHARM REV CODE 636 W HCPCS

## 2024-12-24 PROCEDURE — 99213 OFFICE O/P EST LOW 20 MIN: CPT | Mod: S$PBB,,,

## 2024-12-24 RX ORDER — DEXAMETHASONE SODIUM PHOSPHATE 4 MG/ML
4 INJECTION, SOLUTION INTRA-ARTICULAR; INTRALESIONAL; INTRAMUSCULAR; INTRAVENOUS; SOFT TISSUE
Status: COMPLETED | OUTPATIENT
Start: 2024-12-24 | End: 2024-12-24

## 2024-12-24 RX ORDER — DEXBROMPHENIRAMINE MALEATE, PHENYLEPHRINE HYDROCHLORIDE 2; 7.5 MG/1; MG/1
2-7.5 TABLET ORAL 2 TIMES DAILY
Qty: 14 TABLET | Refills: 0 | Status: SHIPPED | OUTPATIENT
Start: 2024-12-24 | End: 2024-12-31

## 2024-12-24 RX ORDER — PROMETHAZINE HYDROCHLORIDE AND DEXTROMETHORPHAN HYDROBROMIDE 6.25; 15 MG/5ML; MG/5ML
5 SYRUP ORAL EVERY 4 HOURS PRN
Qty: 118 ML | Refills: 0 | Status: SHIPPED | OUTPATIENT
Start: 2024-12-24 | End: 2025-01-03

## 2024-12-24 RX ADMIN — DEXAMETHASONE SODIUM PHOSPHATE 4 MG: 4 INJECTION, SOLUTION INTRA-ARTICULAR; INTRALESIONAL; INTRAMUSCULAR; INTRAVENOUS; SOFT TISSUE at 12:12

## 2024-12-24 NOTE — PROGRESS NOTES
"Subjective:       Patient ID: Errol Hannah is a 64 y.o. male.    Vitals:  height is 5' 9" (1.753 m) and weight is 140.4 kg (309 lb 8 oz) (abnormal). His temperature is 97.9 °F (36.6 °C). His blood pressure is 147/84 (abnormal) and his pulse is 79. His respiration is 20 and oxygen saturation is 98%.     Chief Complaint: URI (Cough, sneezing since Sunday. Declines testing)    64-year-old male presents to the clinic with complaints of cough, sneezing, and congestion that began on Sunday.  Patient does not wish to have respiratory testing at today's visit and is requesting a steroid shot at today's visit as well.  Patient denies fever, nausea, vomiting, diarrhea, abdominal pain, headache, chills, body aches.  Patient states he is a carrier for living and has been in and out of his car in the cold air and feels like this is contributing to his illness.  Patient denies any other sick contacts.    All other systems are negative    Chart reviewed    Objective:   Physical Exam   Constitutional: He appears well-developed.  Non-toxic appearance. He does not appear ill. No distress.   HENT:   Head: Normocephalic and atraumatic.   Ears:   Right Ear: Hearing, tympanic membrane, external ear and ear canal normal.   Left Ear: Hearing, tympanic membrane, external ear and ear canal normal.   Nose: Mucosal edema and rhinorrhea present. No purulent discharge. Right sinus exhibits no maxillary sinus tenderness and no frontal sinus tenderness. Left sinus exhibits no maxillary sinus tenderness and no frontal sinus tenderness.   Mouth/Throat: Uvula is midline. Oropharyngeal exudate, posterior oropharyngeal edema and posterior oropharyngeal erythema present.   Eyes: Right eye exhibits no discharge. Left eye exhibits no discharge. Extraocular movement intact   Neck: Neck supple. No neck rigidity present.   Cardiovascular: Regular rhythm.   Pulmonary/Chest: Effort normal and breath sounds normal. No respiratory distress. He has no wheezes. He " has no rhonchi. He has no rales.   Lymphadenopathy:     He has no cervical adenopathy.   Neurological: He is alert.   Skin: Skin is warm, dry and not diaphoretic.   Psychiatric: His behavior is normal.   Nursing note and vitals reviewed.      Assessment:     1. Viral URI with cough            Plan:       Please drink plenty of fluids.  Please get plenty of rest.  Please return here or go to the Emergency Department for any concerns or worsening of condition.  If you were prescribed a narcotic medication, do not drive or operate heavy equipment or machinery while taking these medications.  If you were given a steroid shot in the clinic and have also been given a prescription for a steroid such as Prednisone or a Medrol Dose Pack, please begin taking them tomorrow.  If you do not have Hypertension or any history of palpitations, it is ok to take over the counter Sudafed or Mucinex D or Allegra-D or Claritin-D or Zyrtec-D.  If you do take one of the above, it is ok to combine that with plain over the counter Mucinex or Allegra or Claritin or Zyrtec.  If for example you are taking Zyrtec -D, you can combine that with Mucinex, but not Mucinex-D.  If you are taking Mucinex-D, you can combine that with plain Allegra or Claritin or Zyrtec.   If you do have Hypertension or palpitations, it is safe to take Coricidin HBP for relief of sinus symptoms.  If not allergic and not contraindicated because of your medical conditions, please take over the counter Tylenol (Acetaminophen) and/or Motrin (Ibuprofen) as directed for control of pain and/or fever.  Please follow up with your primary care doctor or specialist as needed.  If you  smoke, please stop smoking.      Viral URI with cough  -     dexAMETHasone injection 4 mg  -     dexbrompheniramine-phenyleph (ALAHIST PE) 2-7.5 mg Tab; Take 2-7.5 mg by mouth 2 (two) times a day. for 7 days  Dispense: 14 tablet; Refill: 0  -     promethazine-dextromethorphan (PROMETHAZINE-DM) 6.25-15  mg/5 mL Syrp; Take 5 mLs by mouth every 4 (four) hours as needed (cough).  Dispense: 118 mL; Refill: 0        Please note: This chart was completed via voice to text dictation. It may contain typographical/word recognition errors. If there are any questions, please contact the provider for final clarification.

## 2025-01-30 ENCOUNTER — OFFICE VISIT (OUTPATIENT)
Dept: URGENT CARE | Facility: CLINIC | Age: 65
End: 2025-01-30
Payer: COMMERCIAL

## 2025-01-30 VITALS
WEIGHT: 313.63 LBS | SYSTOLIC BLOOD PRESSURE: 147 MMHG | TEMPERATURE: 99 F | OXYGEN SATURATION: 98 % | RESPIRATION RATE: 18 BRPM | DIASTOLIC BLOOD PRESSURE: 81 MMHG | HEART RATE: 81 BPM | BODY MASS INDEX: 46.45 KG/M2 | HEIGHT: 69 IN

## 2025-01-30 DIAGNOSIS — R05.1 ACUTE COUGH: ICD-10-CM

## 2025-01-30 DIAGNOSIS — J06.9 UPPER RESPIRATORY TRACT INFECTION, UNSPECIFIED TYPE: Primary | ICD-10-CM

## 2025-01-30 PROCEDURE — 99213 OFFICE O/P EST LOW 20 MIN: CPT | Mod: S$PBB,,,

## 2025-01-30 PROCEDURE — 99214 OFFICE O/P EST MOD 30 MIN: CPT | Mod: PBBFAC

## 2025-01-30 RX ORDER — DEXBROMPHENIRAMINE MALEATE, PHENYLEPHRINE HYDROCHLORIDE 2; 7.5 MG/1; MG/1
2-7.5 TABLET ORAL 2 TIMES DAILY
Qty: 14 TABLET | Refills: 0 | Status: SHIPPED | OUTPATIENT
Start: 2025-01-30 | End: 2025-02-06

## 2025-01-30 RX ORDER — PROMETHAZINE HYDROCHLORIDE AND DEXTROMETHORPHAN HYDROBROMIDE 6.25; 15 MG/5ML; MG/5ML
5 SYRUP ORAL EVERY 4 HOURS PRN
Qty: 118 ML | Refills: 0 | Status: SHIPPED | OUTPATIENT
Start: 2025-01-30 | End: 2025-02-09

## 2025-01-30 RX ORDER — BENZONATATE 200 MG/1
200 CAPSULE ORAL 3 TIMES DAILY PRN
Qty: 30 CAPSULE | Refills: 0 | Status: SHIPPED | OUTPATIENT
Start: 2025-01-30 | End: 2025-02-09

## 2025-01-30 NOTE — PROGRESS NOTES
", all after thank in Subjective:       Patient ID: Errol Hannah is a 64 y.o. male.    Vitals:  height is 5' 9" (1.753 m) and weight is 142.2 kg (313 lb 9.6 oz) (abnormal). His temperature is 98.6 °F (37 °C). His blood pressure is 147/81 (abnormal) and his pulse is 81. His respiration is 18 and oxygen saturation is 98%.     Chief Complaint: Cough (States persistent cough for about a month. Worsened in the last 2 days with some acid reflux.)    64-year-old male presents to the clinic with complaints of a cough and acid reflux.  Patient states he has never had acid reflux before and that he would like to try taking over-the-counter Tums for this issue.  Patient states that his cough worsened in the last 2 days but that he has had a cough for about a month.  The patient states that his wife is also ill and currently has a cough as well.  Patient states that the cough is worse at night when the patient lies down.  Patient denies fever, nausea, vomiting, abdominal pain, headache, chills, fever, body aches.    All other systems are negative    Chart reviewed    Objective:   Physical Exam   Constitutional: He appears well-developed.  Non-toxic appearance. He does not appear ill. No distress.   HENT:   Head: Normocephalic and atraumatic.   Ears:   Right Ear: Hearing, tympanic membrane, external ear and ear canal normal.   Left Ear: Hearing, tympanic membrane, external ear and ear canal normal.   Nose: Mucosal edema and rhinorrhea present. No purulent discharge. Right sinus exhibits no maxillary sinus tenderness and no frontal sinus tenderness. Left sinus exhibits no maxillary sinus tenderness and no frontal sinus tenderness.   Mouth/Throat: Uvula is midline. Posterior oropharyngeal edema and posterior oropharyngeal erythema present.   Eyes: Right eye exhibits no discharge. Left eye exhibits no discharge. Extraocular movement intact   Neck: Neck supple. No neck rigidity present.   Cardiovascular: Regular rhythm. "   Pulmonary/Chest: Effort normal and breath sounds normal. No respiratory distress. He has no wheezes. He has no rhonchi. He has no rales.   Lymphadenopathy:     He has no cervical adenopathy.   Neurological: He is alert.   Skin: Skin is warm, dry and not diaphoretic.   Psychiatric: His behavior is normal.   Nursing note and vitals reviewed.      Assessment:     1. Acute cough            Plan:     If a test result is still pending, we will notify you if it is positive.  However, you can see all of your results on your patient portal.    Please drink plenty of fluids.  Please get plenty of rest.  Please return here or go to the Emergency Department for any concerns or worsening of condition.  If you were prescribed antibiotics, please take them to completion.  If you were given wait & see antibiotics, please wait 4-7 days before taking them, and only take them if your symptoms have not improved, or your symptoms have worsened.  If you do begin taking the antibiotics, please take them to completion.  If you were prescribed a narcotic medication, do not drive or operate heavy equipment or machinery while taking these medications.  If you were given a steroid shot in the clinic and have also been given a prescription for a steroid such as Prednisone or a Medrol Dose Pack, please begin taking them tomorrow.  If you do not have Hypertension or any history of palpitations, it is ok to take over the counter Sudafed or Mucinex D or Allegra-D or Claritin-D or Zyrtec-D.  If you do take one of the above, it is ok to combine that with plain over the counter Mucinex or Allegra or Claritin or Zyrtec.  If for example you are taking Zyrtec -D, you can combine that with Mucinex, but not Mucinex-D.  If you are taking Mucinex-D, you can combine that with plain Allegra or Claritin or Zyrtec.   If you do have Hypertension or palpitations, it is safe to take Coricidin HBP for relief of sinus symptoms.  If not allergic and not  contraindicated because of your medical conditions, please take over the counter Tylenol (Acetaminophen) and/or Motrin (Ibuprofen) as directed for control of pain and/or fever.  Please follow up with your primary care doctor or specialist as needed.  If you  smoke, please stop smoking.      Acute cough  -     dexbrompheniramine-phenyleph (ALAHIST PE) 2-7.5 mg Tab; Take 2-7.5 mg by mouth 2 (two) times a day. for 7 days  Dispense: 14 tablet; Refill: 0  -     promethazine-dextromethorphan (PROMETHAZINE-DM) 6.25-15 mg/5 mL Syrp; Take 5 mLs by mouth every 4 (four) hours as needed (cough).  Dispense: 118 mL; Refill: 0  -     benzonatate (TESSALON) 200 MG capsule; Take 1 capsule (200 mg total) by mouth 3 (three) times daily as needed for Cough.  Dispense: 30 capsule; Refill: 0        Please note: This chart was completed via voice to text dictation. It may contain typographical/word recognition errors. If there are any questions, please contact the provider for final clarification.